# Patient Record
Sex: MALE | Race: WHITE | Employment: FULL TIME | URBAN - METROPOLITAN AREA
[De-identification: names, ages, dates, MRNs, and addresses within clinical notes are randomized per-mention and may not be internally consistent; named-entity substitution may affect disease eponyms.]

---

## 2022-12-02 ENCOUNTER — OFFICE VISIT (OUTPATIENT)
Dept: ORTHOPEDIC SURGERY | Age: 57
End: 2022-12-02
Payer: COMMERCIAL

## 2022-12-02 VITALS — WEIGHT: 315 LBS | HEIGHT: 73 IN | BODY MASS INDEX: 41.75 KG/M2

## 2022-12-02 DIAGNOSIS — M17.11 OSTEOARTHRITIS OF RIGHT KNEE, UNSPECIFIED OSTEOARTHRITIS TYPE: Primary | ICD-10-CM

## 2022-12-02 NOTE — PATIENT INSTRUCTIONS

## 2022-12-02 NOTE — PROGRESS NOTES
Name: Juan C Kohler    : 1965     Service Dept: [address] [facility]    Chief Complaint   Patient presents with    Knee Pain        Visit Vitals  Ht 6' 1\" (1.854 m)   Wt 320 lb (145.2 kg)   BMI 42.22 kg/m²        No Known Allergies        Patient Active Problem List   Diagnosis Code    History of laparoscopic partial gastrectomy Z90.3    Hyperlipidemia E78.5    DJD (degenerative joint disease) M19.90    Cholelithiasis K80.20    Hepatic steatosis K76.0      History reviewed. No pertinent family history. Social History     Socioeconomic History    Marital status:    Tobacco Use    Smoking status: Never    Smokeless tobacco: Never   Substance and Sexual Activity    Alcohol use: No    Drug use: No     Social Determinants of Health     Physical Activity: Insufficiently Active    Days of Exercise per Week: 2 days    Minutes of Exercise per Session: 10 min      Past Surgical History:   Procedure Laterality Date    HX GASTRECTOMY  2012    Laparoscopic vertical sleeve    HX HERNIA REPAIR  8663    Umbilical hernia repair    HX ORTHOPAEDIC      ACL repair    HX OTHER SURGICAL  12    Wedge liver biopsy    HX UROLOGICAL      Kidney stone removal      Past Medical History:   Diagnosis Date    Calculus of kidney     Cholelithiasis     DJD (degenerative joint disease)     GERD (gastroesophageal reflux disease)     Hepatic steatosis     History of laparoscopic partial gastrectomy     Hyperlipidemia     Morbid obesity ([address]         I have reviewed and agree with [address] and [facility]AN[facility] and intake form in chart and the record furthermore I have reviewed prior medical record(s) regarding this patients care during this appointment.      Review of Systems:   Patient is a pleasant appearing individual, appropriately dressed, well hydrated, well nourished, who is alert, appropriately oriented for age, and in no acute distress with a normal gait and normal affect who does not appear to be in any significant pain. Physical Exam:  Right Knee -Decrease range of motion with flexion, Knee arc of greater than 50 degrees, Some crepitation, Grossly neurovascularly intact, Good cap refill, No skin lesion, Moderate swelling, some gross instability, Some quadriceps weakness, Kellgren and Marcio at least grade 3    Left Knee - Full Range of Motion, No crepitation, Grossly neurovascularly intact, Good cap refill, No skin lesion, No swelling, No gross instability, No quadriceps weakness    Encounter Diagnoses     ICD-10-CM ICD-9-CM   1. Osteoarthritis of right knee, unspecified osteoarthritis type  M17.11 715.96       HPI:  The patient is here with a chief complaint of right knee pain, throbbing burning pain. Status post x-rays of right knee which is positive for severe OA with one metallic screw both in the tibia and in the femur done at an outside institution. Continues to have difficulty. Pain is 5/10. Assessment/Plan:  Plan at this point, my recommendation if he decides once he loses the weight would be for [employer] right total knee. We will get new evaluation of his x-rays, but he would need vascular clearance as well. He would be considered high risk because of his BMI. He is going to continue weight loss program and go from there. I did offer injection, but he wants to wait. As part of continued conservative pain management options the patient was advised to utilize Tylenol or OTC NSAIDS as long as it is not medically contraindicated. Return to Office: Follow-up and Dispositions    Return if symptoms worsen or fail to improve. Scribed by Dago Serrano LPN as dictated by [facility] [place] SOWMYA Soto MD.  Documentation True and Accepted Nixon Soto MD

## 2022-12-02 NOTE — LETTER
12/7/2022    Patient: Vanessa Mann   YOB: 1965   Date of Visit: 12/2/2022     Walter Pablo MD  [address] [place] [id number]  Via Fax: [phone]    Dear Walter Pablo MD,      Thank you for referring Mr. Elora Snellen to [facility] [address] AND [facility] for evaluation. My notes for this consultation are attached. If you have questions, please do not hesitate to call me. I look forward to following your patient along with you.       Sincerely,    Krystina Ambriz MD

## 2023-11-10 ENCOUNTER — HOME HEALTH ADMISSION (OUTPATIENT)
Age: 58
End: 2023-11-10
Payer: COMMERCIAL

## 2023-11-11 ENCOUNTER — HOME CARE VISIT (OUTPATIENT)
Age: 58
End: 2023-11-11
Payer: COMMERCIAL

## 2023-11-11 VITALS
DIASTOLIC BLOOD PRESSURE: 78 MMHG | OXYGEN SATURATION: 96 % | SYSTOLIC BLOOD PRESSURE: 140 MMHG | RESPIRATION RATE: 14 BRPM | TEMPERATURE: 97.7 F

## 2023-11-11 PROCEDURE — G0151 HHCP-SERV OF PT,EA 15 MIN: HCPCS

## 2023-11-11 ASSESSMENT — ENCOUNTER SYMPTOMS: PAIN LOCATION - PAIN QUALITY: DULL ACHING

## 2023-11-11 NOTE — HOME HEALTH
of:  1. Walking every hour during the day with RW  2. supine therex R LE AP,QS, SLR, HS, SAQ, gluteal squeezes 3 daily x 10  Written HEP issued, patient/caregiver verbalized understanding. CONTINUED NEED FOR THE FOLLOWING SKILLS: HH PT is medically necessary to address pain, decreased ROM, decreased strength, increased swelling, impaired bed mobility, decreased independence with functional transfers, impaired gait, impaired stair negotiation, and impaired balance in order to improve functional independence, quality of life, return to PLOF, and reduce the risk for falls. ASSESSMENT: Pt presents with decreased AROM R LE. Decreased strength R LE. A required will all bed mobility, transfers. Pt is amb limited distances with RW with A. Pt presents with decreased dynamic standing balance. A required on stairs. PLAN: Pt will be seen to establish and upgrade HEP. Gait training with  the least restrictive A DEV on flat and uneven surfaces. Bed mobility training, transfer training. Stair training. Dynamic standing balance re -education. Reinforce general safety precautions. DISCHARGE PLANNING DISCUSSED: Discharge to self and family under MD supervision once all goals have been met or patient has reached max potential. Patient/caregiver verbalized understanding.  office notified that PT Admit completed 11/11/23. Medications reconciled. All medications are available in the home this visit. The following education was provided regarding medications, medication interactions, and look alike medications. Medications are effective at this time. no severe interactions noted. Pt educated to take Elquis as prescribed. Instructed patient/caregiver to notify PT of signs and symptoms of anticoagulant adverse effects including bleeding gums, blood in urine or stool, easily bruising. Instructed on importance of fall prevention due to risk for bleeding. Pt educated to take tramadol and oxycodone as prescribed.

## 2023-11-12 ENCOUNTER — HOME CARE VISIT (OUTPATIENT)
Age: 58
End: 2023-11-12
Payer: COMMERCIAL

## 2023-11-12 PROCEDURE — G0157 HHC PT ASSISTANT EA 15: HCPCS

## 2023-11-13 ENCOUNTER — HOME CARE VISIT (OUTPATIENT)
Age: 58
End: 2023-11-13
Payer: COMMERCIAL

## 2023-11-13 VITALS
RESPIRATION RATE: 14 BRPM | DIASTOLIC BLOOD PRESSURE: 74 MMHG | TEMPERATURE: 98.3 F | HEART RATE: 86 BPM | OXYGEN SATURATION: 96 % | SYSTOLIC BLOOD PRESSURE: 122 MMHG

## 2023-11-13 PROCEDURE — G0157 HHC PT ASSISTANT EA 15: HCPCS

## 2023-11-13 ASSESSMENT — ENCOUNTER SYMPTOMS: PAIN LOCATION - PAIN QUALITY: TIGHTNESS, SORENESS

## 2023-11-14 VITALS
SYSTOLIC BLOOD PRESSURE: 128 MMHG | RESPIRATION RATE: 16 BRPM | TEMPERATURE: 97.7 F | OXYGEN SATURATION: 99 % | HEART RATE: 70 BPM | DIASTOLIC BLOOD PRESSURE: 80 MMHG

## 2023-11-14 ASSESSMENT — ENCOUNTER SYMPTOMS: PAIN LOCATION - PAIN QUALITY: DULL ACHE

## 2023-11-14 NOTE — HOME HEALTH
SUBJECTIVE: Patient reported feeling low pain #1/10 from his R knee this morning. CAREGIVER INVOLVEMENT/ASSISTANCE NEEDED FOR: Patient's wife assists with transportation, ADLS, and IADLS. HOME HEALTH SUPPLIES BY TYPE AND QUANTITY ORDERED/DELIVERED THIS VISIT INCLUDE: None needed for this visit. OBJECTIVE:  See interventions. PATIENT RESPONSE TO TREATMENT: Patient did not report any increase in pain after walking and exercising. PATIENT LEVEL OF UNDERSTANDING OF EDUCATION PROVIDED: Patient verbalized understanding on anticoagulant meds, fall prevention, pain management techniques, and signs and symptoms for infection. ASSESSMENT OF PROGRESS TOWARD GOALS: Patient addressed goals for gait, bed mobility, and strength. Pt needed SBA for gait training today to ambulate safely. Patient ambulated with decreased maribeth, decreased foot clearance, and forward flexed trunk. Gave multiple vc's for pt to lift B foot higher to clear floor safely and to maintain erect posture. Pt needs reinforcement for safety with gait and to improve posture. Pt also got in and out of bed Mod I and scooting in bed Mod I today. Pt needed extra time but demonstrated good technique for independent bed mobility. In addition, pt performed supine and seated therapeutic exercises. Reinforced compliance with HEP. CONTINUED NEED FOR THE FOLLOWING SKILLS: Gait Training, Stairs Training, Transfer Training, [employer], Balance Training, ROM, and Therapeutic Exercises. PLAN FOR NEXT VISIT: Patient will be seen 1w1 3w1 to increase safety with gait, to increase safety with stairs, to improve transfer technique, to improve bed mobility technique, to improve balance, and to increase strength of B LE. DISCHARGE PLANNING DISCUSSED WITH PATIENT/CAREGIVER: Discharge patient to family with MD supervision when all goals are met. Pt/Caregiver did verbalize understanding of discharge planning.

## 2023-11-14 NOTE — HOME HEALTH
encouragement. Plan:  Discharge planning discussed at this visit regarding eventual discharge once patient has met goals and/or met maximum benefit from home health skilled PT services with patient understanding and agreeable at this time. Continued need for skilled home health PT at this time to address deficits, reduce risk of falls, and obtain goals as previously established per plan of care. Further gait training and target lateral quadrant tightness/restrictions to be able to make better corrections towards improving toeing out with right LE. Monitor for pain with completion of skilled therapy interventions.

## 2023-11-15 ENCOUNTER — HOME CARE VISIT (OUTPATIENT)
Age: 58
End: 2023-11-15
Payer: COMMERCIAL

## 2023-11-15 VITALS
OXYGEN SATURATION: 99 % | DIASTOLIC BLOOD PRESSURE: 65 MMHG | HEART RATE: 64 BPM | SYSTOLIC BLOOD PRESSURE: 132 MMHG | TEMPERATURE: 97.9 F | RESPIRATION RATE: 16 BRPM

## 2023-11-15 PROCEDURE — G0157 HHC PT ASSISTANT EA 15: HCPCS

## 2023-11-15 ASSESSMENT — ENCOUNTER SYMPTOMS: PAIN LOCATION - PAIN QUALITY: DULL ACHE

## 2023-11-16 NOTE — HOME HEALTH
strength of B LE. DISCHARGE PLANNING DISCUSSED WITH PATIENT/CAREGIVER: Discharge patient to family with MD supervision when all goals are met. Pt/Caregiver did verbalize understanding of discharge planning.

## 2023-11-20 ENCOUNTER — HOME CARE VISIT (OUTPATIENT)
Age: 58
End: 2023-11-20
Payer: COMMERCIAL

## 2023-11-20 VITALS
DIASTOLIC BLOOD PRESSURE: 78 MMHG | RESPIRATION RATE: 16 BRPM | SYSTOLIC BLOOD PRESSURE: 132 MMHG | TEMPERATURE: 97.7 F | OXYGEN SATURATION: 99 % | HEART RATE: 66 BPM

## 2023-11-20 PROCEDURE — G0157 HHC PT ASSISTANT EA 15: HCPCS

## 2023-11-20 NOTE — HOME HEALTH
SUBJECTIVE: Patient reported feeling no pain from his R knee this morning again this morning. CAREGIVER INVOLVEMENT/ASSISTANCE NEEDED FOR: Patient's wife assists with transportation, ADLS, and IADLS. HOME HEALTH SUPPLIES BY TYPE AND QUANTITY ORDERED/DELIVERED THIS VISIT INCLUDE: None needed for this visit. OBJECTIVE:  See interventions. PATIENT RESPONSE TO TREATMENT: Patient did not report any increase in pain after walking outside, performing car transfer, and negotiating stairs. PATIENT LEVEL OF UNDERSTANDING OF EDUCATION PROVIDED: Patient repeated back teaching on anticoagulant meds, fall prevention, pain management techniques, and signs and symptoms for infection. ASSESSMENT OF PROGRESS TOWARD GOALS: Patient addressed goals for gait, stairs, and transfers. Pt continues to need Supervision to ambulate safely but progressed to ambulating outside using [facility] for gait training today over uneven surfaces. Pt ambulated with slight R antalgic gait but demonstrated good safety awareness. Pt also previously did not go up and down stairs but progressed to Supervision for stair training today. Pt led with his L LE to go up stairs and led with his R LE to go down stairs. Pt demonstrated good safety awareness with stairs negotiation. In addition, pt previously needed Supervision to perform sit < > stand transfers but progressed to Mod I for transfer training today. Patient demonstrated good technique and improved ability to perform sit < > stand transfers and to get in and out of his car independently. Moreover, R knee ROM improved to 0 - 100 degrees. Discussed with patient/CG plan to discharge pt from [facility] PT services this week. Pt/CG verbalized understanding and is in agreement with discharge plan. CONTINUED NEED FOR THE FOLLOWING SKILLS: Gait Training, Stairs Training, Transfer Training, [employer], Balance Training, ROM, and Therapeutic Exercises.   PLAN FOR NEXT VISIT: Patient will be seen 2w1 to increase

## 2023-11-21 ENCOUNTER — HOME CARE VISIT (OUTPATIENT)
Age: 58
End: 2023-11-21
Payer: COMMERCIAL

## 2023-11-21 VITALS
TEMPERATURE: 98.3 F | OXYGEN SATURATION: 99 % | DIASTOLIC BLOOD PRESSURE: 70 MMHG | SYSTOLIC BLOOD PRESSURE: 122 MMHG | RESPIRATION RATE: 16 BRPM | HEART RATE: 60 BPM

## 2023-11-21 PROCEDURE — G0157 HHC PT ASSISTANT EA 15: HCPCS

## 2023-11-22 NOTE — HOME HEALTH
SUBJECTIVE: Patient reported feeling good this morning and had no c/o pain from his R knee. CAREGIVER INVOLVEMENT/ASSISTANCE NEEDED FOR: Patient's wife assists with transportation, ADLS, and IADLS. HOME HEALTH SUPPLIES BY TYPE AND QUANTITY ORDERED/DELIVERED THIS VISIT INCLUDE: None needed for this visit. OBJECTIVE:  See interventions. PATIENT RESPONSE TO TREATMENT: Patient did not report any increase in pain after walking outside and negotiating stairs. PATIENT LEVEL OF UNDERSTANDING OF EDUCATION PROVIDED: Goal met. ASSESSMENT OF PROGRESS TOWARD GOALS: Patient addressed goals for gait, stairs, balance, and ROM. Pt previously needed Supervision to ambulate safely but progressed to Mod I using SBQC for gait training today over uneven surfaces. Pt ambulated with improved stability and demonstrated good safety awareness with ambulation. Pt also previously needed Supervision to go up and down stairs but progressed to Mod I for stair training today. Pt led with his L LE to go up stairs and led with his R LE to go down stairs. Pt demonstrated good safety awareness with stairs negotiation. In addition, patient demonstrated improved balance as evidence by improving his Tinetti balance assessment score from 18/28 from PT initial evaluation to 22/28 today. Moreover, R knee ROM improved to 0 - 105 degrees. Discussed with patient/CG plan to discharge pt from [facility] PT services next visit. Pt/CG verbalized understanding and is in agreement with discharge plan. He will begin outpatient PT on Wednesday, 11/29/23, at [facility] [place] [facility] in [place], [place]. CONTINUED NEED FO R THE FOLLOWING SKILLS: PT Reassessment. PLAN FOR NEXT VISIT: Patient will be seen 1w1 to reassess safety with gait, safety with stairs, transfer technique, bed mobility technique, balance, and strength of B LE. DISCHARGE PLANNING DISCUSSED WITH PATIENT/CAREGIVER: Discharge patient to family with MD supervision when all goals are met.  Pt/Caregiver did

## 2023-11-24 ENCOUNTER — HOME CARE VISIT (OUTPATIENT)
Age: 58
End: 2023-11-24
Payer: COMMERCIAL

## 2023-11-24 VITALS
OXYGEN SATURATION: 96 % | HEART RATE: 68 BPM | SYSTOLIC BLOOD PRESSURE: 135 MMHG | DIASTOLIC BLOOD PRESSURE: 68 MMHG | TEMPERATURE: 97 F | RESPIRATION RATE: 16 BRPM

## 2023-11-24 PROCEDURE — G0151 HHCP-SERV OF PT,EA 15 MIN: HCPCS

## 2023-11-24 ASSESSMENT — ENCOUNTER SYMPTOMS: PAIN LOCATION - PAIN QUALITY: DULL ACHING

## 2023-11-24 NOTE — HOME HEALTH
Pt called and would like someone to call her back in regards to [facility] papers. Pt can be reached at HOME: [phone]   WORK: N/A   CELL: [phone]        remained the same throughout tx session   PATIENT LEVEL OF UNDERSTANDING OF EDUCATION PROVIDED  Cont to use SBQC at all times when amb in public for safety   PATIENT EDUCATION PROVIDED THIS VISIT: safety, HEP, walking, deep breathing,         HEP consisting of:  1. Walking every hour during the daytime for 3-5 minutes using SBQC   2. Supine: APs, heel slides, quad sets, glut sets, SLR, and SAQ x 10 reps each, 3x/day. 3. Sitting: APs, R LAQ, R knee flexion, and hip abd x 10 reps each 3x/day. 4. Standing: HR/TR, R hip flexion, and R hamstring curls x 10 reps each 3x/day  Written HEP issued, patient/caregiver verbalized understanding. Patient is s/p R TKR and has been treated for ROM, strengthening, gait training, stair training, HEP training, safety training, and balance training. On [facility]  11/11/23  AROM was R knee is sitting -9-80 degrees. Today at NM AROM is R knee in sitting 0-105 degrees  On SOC strength was  R hip flexors -3/5 R quads and hamstrings -3/5 R DF/PF 5/5  L hip flexors, quads, hamstrings DF/PF 5/5. Today at NM strength is  R hip flexors +45 R quads and hamstrings +4/5 R DF/PF 5/5 . On [facility] bed mobility was sit <> supine with S. Pt was educated to use contra lateral leg A to assist with transfer is necessary to prevent staining lower abs as her recently had a hernia repair . Today at [place] bed mobility is :supine<>sit MOD I. On [facility] transfers were sit to stand from couch from recliner chair. MOD I. Per PTA visit 11/20/23 Patient progressed to performing sit < > stand transfer Mod I from his couch, toilet, and bed as well as performing car transfer Mod I to promote increased safety and increased independence for all transfers. Patient demonstrated good technique and improved ability to perform sit < > stand transfers and to get in and out of his car independently. .  Today at NM transfers are sit to stand from couch from recliner chair. MOD I.  Per PTA visit 11/20/23 Patient progressed to performing

## 2023-11-29 ENCOUNTER — HOSPITAL ENCOUNTER (OUTPATIENT)
Facility: HOSPITAL | Age: 58
Setting detail: RECURRING SERIES
Discharge: HOME | End: 2023-12-02
Payer: COMMERCIAL

## 2023-11-29 PROCEDURE — 97110 THERAPEUTIC EXERCISES: CPT

## 2023-11-29 PROCEDURE — 97162 PT EVAL MOD COMPLEX 30 MIN: CPT

## 2023-11-29 NOTE — THERAPY EVALUATION
PHYSICAL / OCCUPATIONAL THERAPY - DAILY TREATMENT NOTE (updated )  For Eval visit    Patient Name: Crystal Spencer    Date: 2023    : 1965  Insurance: Payor: Geraldine Luo / Plan: Geraldine Luo / Product Type: *No Product type* /      Patient  verified yes     Visit #   Current / Total 1 8-12   Time   In / Out 3:10 3:55   Pain   In / Out 1/10 1/10   Subjective Functional Status/Changes: See POC     TREATMENT AREA =  see POC    OBJECTIVE           35 min   Eval - untimed                      Therapeutic Procedures: Tx Min Billable or 1:1 Min (if diff from Tx Min) Procedure, Rationale, Specifics   10  [address] Therapeutic Exercise (timed):  increase ROM, strength, coordination, balance, and proprioception to improve patient's ability to progress to PLOF and address remaining functional goals.  (see flow sheet as applicable)     Details if applicable:                   Details if applicable:            Details if applicable:            Details if applicable:     10  [facility] Totals Reminder: bill using total billable min of TIMED therapeutic procedures (example: do not include dry needle or estim unattended, both untimed codes, in totals to left)  8-22 min = 1 unit; 23-37 min = 2 units; 38-52 min = 3 units; 53-67 min = 4 units; 68-82 min = 5 units   Total Total     [x]  Patient Education billed concurrently with other procedures   [x] Review HEP    [] Progressed/Changed HEP, detail:    [] Other detail:       Objective Information/Functional Measures/Assessment    See POC    Patient will continue to benefit from skilled PT / OT services to modify and progress therapeutic interventions, analyze and address functional mobility deficits, analyze and address ROM deficits, analyze and address strength deficits, analyze and address soft tissue restrictions, analyze and cue for proper movement patterns, analyze and modify for postural abnormalities, analyze and address imbalance/dizziness, and instruct in home and
bilateral girth measures taken and listed above the edema is considered significant and having an impact on the patient's strength, balance, gait, transfers, self care, and ADL's), and 63137 Gait Training  Patient / Family readiness to learn indicated by: asking questions, trying to perform skills, interest, return verbalization , and return demonstration   Persons(s) to be included in education: patient (P)  Barriers to Learning/Limitations: none  Measures taken if barriers to learning present: n/a  Patient Goal (s): \"walk without cane, tolerate prolonged standing\"  Patient Self Reported Health Status: good  Rehabilitation Potential: good    Short Term Goals: To be accomplished in 4 weeks  Pt will be ind and compliant with HEP For self management of sx  Eval: issued; return demos understanding  2. Improve R knee flexion AROM to 105 deg to improve gait symmetry and transfers  Eval: 88  Long Term Goals: To be accomplished in 8 weeks  Improve R knee AROM >/= 0-125 deg to improve functional mobility for transfers and stairs  Eval: 1-0-88  2. Pt will ascend/descend 4 stairs with reciprocal pattern, 1 HR in order to improve community/household accessibility  Eval: non-reciprocal pattern with B HR  3. Improve FOTO Score to >/= 70/100 to indicate improved function  Eval: 52/100    Frequency / Duration: Patient to be seen 2-3 times per week for 4 weeks    Post operative: URGENT: Patient was evaluated for a post operative condition and early physical therapy intervention is critical to positive outcomes. Insurance authorization should be expedited to prevent delay in treatment.         Patient/ Caregiver education and instruction: Diagnosis, prognosis, self care and exercises [x]  Plan of care has been reviewed with PTA    Certification Period: Miguel Castellanos PT       11/29/2023       1:01 PM    Payor: Nehemias Medina / Plan: Nehemias Medina / Product Type: *No Product type* /     No Physician signature required

## 2023-12-06 ENCOUNTER — HOSPITAL ENCOUNTER (OUTPATIENT)
Facility: HOSPITAL | Age: 58
Setting detail: RECURRING SERIES
Discharge: HOME | End: 2023-12-09
Payer: COMMERCIAL

## 2023-12-06 PROCEDURE — 97530 THERAPEUTIC ACTIVITIES: CPT

## 2023-12-06 PROCEDURE — 97140 MANUAL THERAPY 1/> REGIONS: CPT

## 2023-12-06 PROCEDURE — 97110 THERAPEUTIC EXERCISES: CPT

## 2023-12-06 NOTE — PROGRESS NOTES
units; 38-52 min = 3 units; 53-67 min = 4 units; 68-82 min = 5 units   Total Total     [x]  Patient Education billed concurrently with other procedures   [x] Review HEP    [] Progressed/Changed HEP, detail:    [] Other detail:       Objective Information/Functional Measures/Assessment    - Right knee ROM: -2-104 deg  Initiated POC as per flowsheet. Pt arrived for first therapy follow-up since IE. Pt tolerated treatment session well with min to mod challenge without increased pain or discomfort. Pt required VC for proper form and technique with therex to prevent compensatory movements. Extensor lag noted during SLR. Noted decreased HS flexibility with hamstring stretch. Pt is compliant with HEP. Right knee strength and AROM continues to increase. No change in pain noted post treatment session. Will continue to progress per pt tolerance. Patient will continue to benefit from skilled PT / OT services to modify and progress therapeutic interventions, analyze and address functional mobility deficits, analyze and address ROM deficits, and analyze and address strength deficits to address functional deficits and attain remaining goals. Progress toward goals / Updated goals:  []  See Progress Note/Recertification    Short Term Goals: To be accomplished in 4 weeks  Pt will be ind and compliant with HEP For self management of sx  Eval: issued; return demos understanding; 12/6/23: Progressing, reports initial compliance  2. Improve R knee flexion AROM to 105 deg to improve gait symmetry and transfers  Eval: 88  Long Term Goals: To be accomplished in 8 weeks  Improve R knee AROM >/= 0-125 deg to improve functional mobility for transfers and stairs  Eval: 1-0-88  2. Pt will ascend/descend 4 stairs with reciprocal pattern, 1 HR in order to improve community/household accessibility  Eval: non-reciprocal pattern with B HR  3.  Improve FOTO Score to >/= 70/100 to indicate improved function  Eval: 52/100    Next PN/ RC due

## 2023-12-12 ENCOUNTER — HOSPITAL ENCOUNTER (OUTPATIENT)
Facility: HOSPITAL | Age: 58
Setting detail: RECURRING SERIES
Discharge: HOME | End: 2023-12-15
Payer: COMMERCIAL

## 2023-12-12 PROCEDURE — 97116 GAIT TRAINING THERAPY: CPT

## 2023-12-12 PROCEDURE — 97140 MANUAL THERAPY 1/> REGIONS: CPT

## 2023-12-12 PROCEDURE — 97110 THERAPEUTIC EXERCISES: CPT

## 2023-12-12 PROCEDURE — 97530 THERAPEUTIC ACTIVITIES: CPT

## 2023-12-12 NOTE — PROGRESS NOTES
PHYSICAL / OCCUPATIONAL THERAPY - DAILY TREATMENT NOTE (updated )    Patient Name: Yoselyn Thayer    Date: 2023    : 1965  Insurance: Payor: Arlin Tomas / Plan: DENISE NORMAN / Product Type: *No Product type* /      Patient  verified Yes     Visit #   Current / Total 3 8-12   Time   In / Out 330 424   Pain   In / Out 0 \"achey\"  0   Subjective Functional Status/Changes: Patient reports no new changes. He states that he felt \"pretty good\" after his initial visit last week. \"I think I'm getting a cold\" . Patient states that he has begun stepping \"normally\" at work now. \"Going up isn't bad, coming down takes a little longer\"      TREATMENT AREA =  Right knee pain [M25.561]    OBJECTIVE         Therapeutic Procedures:  66885 Therapeutic Exercise (timed):  increase ROM, strength, coordination, balance, and proprioception to improve patient's ability to progress to PLOF and address remaining functional goals. Tx Min Billable or 1:1 Min   (if diff from Tx Min) Details:   12  See flow sheet as applicable     67543 Manual Therapy (timed):  decrease pain, increase ROM, increase tissue extensibility, and decrease trigger points to improve patient's ability to progress to PLOF and address remaining functional goals. The manual therapy interventions were performed at a separate and distinct time from the therapeutic activities interventions . Tx Min Billable or 1:1 Min   (if diff from Tx Min) Details:   12  See flow sheet as applicable; Short sitting: PROM into right knee flexion and extension  Supine: STM/TPR to right HS/right popliteus/right quad, PROM into right knee flexion and extension, Grade 2 -3 TF joint mobs      33897 Therapeutic Activity (timed):  use of dynamic activities replicating functional movements to increase ROM, strength, coordination, balance, and proprioception in order to improve patient's ability to progress to PLOF and address remaining functional goals.    Tx Min Billable or 1:1

## 2023-12-21 ENCOUNTER — HOSPITAL ENCOUNTER (OUTPATIENT)
Facility: HOSPITAL | Age: 58
Setting detail: RECURRING SERIES
Discharge: HOME | End: 2023-12-24
Payer: COMMERCIAL

## 2023-12-21 PROCEDURE — 97110 THERAPEUTIC EXERCISES: CPT

## 2023-12-21 PROCEDURE — 97140 MANUAL THERAPY 1/> REGIONS: CPT

## 2023-12-21 PROCEDURE — 97112 NEUROMUSCULAR REEDUCATION: CPT

## 2023-12-28 ENCOUNTER — HOSPITAL ENCOUNTER (OUTPATIENT)
Facility: HOSPITAL | Age: 58
Setting detail: RECURRING SERIES
Discharge: HOME | End: 2023-12-31
Payer: COMMERCIAL

## 2023-12-28 PROCEDURE — 97530 THERAPEUTIC ACTIVITIES: CPT

## 2023-12-28 PROCEDURE — 97110 THERAPEUTIC EXERCISES: CPT

## 2023-12-28 PROCEDURE — 97140 MANUAL THERAPY 1/> REGIONS: CPT

## 2023-12-28 NOTE — PROGRESS NOTES
[facility]  [address] [place] [address]  Phone: [phone]   [phone]  PHYSICAL THERAPY PROGRESS NOTE  Patient Name: Shaan Do : 1965   Treatment/Medical Diagnosis: Right knee pain [M25.561]   Referral Source: Javier Baez MD     Date of Initial Visit: 23 Attended Visits: 7 Missed Visits: 0     SUMMARY OF TREATMENT  Physical therapy has consisted of therapeutic exercises for increased LE strength, ROM, and flexibility. Therapeutic activities performed to improve functional activities, model real life movements and performance specific to the patient's need with supervision to return the patient to their PLOF.  Neuromuscular Re-ed performed to improve coordination, improve mm activation, improve proprioception to improve the patient's ability to perform ADL/IADL's.  Manual therapy PRN for decreased muscle hypertonicity and increased ROM/flexibility. Pt education provided regarding HEP.     CURRENT STATUS  Pt is a 58 y.o. male who presents to PT with c/o R knee pain s/p R TKA (DOS 2023). Pt has returned to work ~1 month (40 hr/wk) with increased prolonged standing on concrete which he reports increases his pain/stiffness.     % improvement: 60%  Max pain 1/10  Avg pain 0-1/10; \"it's just an ache most of the time\"  Min pain 0/10    Current improvements:  Pt reports improved walking tolerance, improved general activity tolerance. Good objective increase in LE strength and ROM as per objective measurements below.  Remaining functional limitations:  worse with walking (>15 min), bending, stairs (descending>ascending), squats, prolonged positioning (causes it to \"lock up\")    Objective measures:  FUNCTIONAL ASSESSMENT:  Ambulates wihtout AD, with R too many toes sign, min antalgic RLE with slight decrease in push off and stance time  Squat 85 deg with compensatory R hip ER, slight L wt shift, quad dominant

## 2023-12-28 NOTE — PROGRESS NOTES
PHYSICAL / OCCUPATIONAL THERAPY - DAILY TREATMENT NOTE (updated )    Patient Name: Shaan Do    Date: 2023    : 1965  Insurance: Payor: DENISE / Plan: DENISE RPN / Product Type: *No Product type* /      Patient  verified Yes     Visit #   Current / Total 7 8-12   Time   In / Out 2:40 3:25   Pain   In / Out 0.5 \"ache\" 0   Subjective Functional Status/Changes: See PN     TREATMENT AREA =  Right knee pain [M25.561]    OBJECTIVE         Therapeutic Procedures:    Tx Min Billable or 1:1 Min (if diff from Tx Min) Procedure, Rationale, Specifics   12  [id number] Therapeutic Exercise (timed):  increase ROM, strength, coordination, balance, and proprioception to improve patient's ability to progress to PLOF and address remaining functional goals. (see flow sheet as applicable)     Details if applicable:       25  85149 Therapeutic Activity (timed):  use of dynamic activities replicating functional movements to increase ROM, strength, coordination, balance, and proprioception in order to improve patient's ability to progress to PLOF and address remaining functional goals.  (see flow sheet as applicable)     Details if applicable:     8  33308 Manual Therapy (timed):  decrease pain, increase ROM, increase tissue extensibility, and decrease trigger points to improve patient's ability to progress to PLOF and address remaining functional goals.  The manual therapy interventions were performed at a separate and distinct time from the therapeutic activities interventions . (see flow sheet as applicable)     Details if applicable:  STM to R quadriceps, R medial HS; R knee flexion PROM all performed in supine. CFM to superior incision (distal quad/quad tendon)          Details if applicable:            Details if applicable:     45  [facility] Totals Reminder: bill using total billable min of TIMED therapeutic procedures (example: do not include dry needle or estim unattended, both untimed codes, in totals to

## 2024-01-03 ENCOUNTER — HOSPITAL ENCOUNTER (OUTPATIENT)
Facility: HOSPITAL | Age: 59
Setting detail: RECURRING SERIES
Discharge: HOME | End: 2024-01-06
Payer: COMMERCIAL

## 2024-01-03 PROCEDURE — 97110 THERAPEUTIC EXERCISES: CPT

## 2024-01-03 PROCEDURE — 97530 THERAPEUTIC ACTIVITIES: CPT

## 2024-01-03 NOTE — PROGRESS NOTES
PHYSICAL / OCCUPATIONAL THERAPY - DAILY TREATMENT NOTE (updated )    Patient Name: Shaan Do    Date: 1/3/2024    : 1965  Insurance: Payor: DENISE / Plan: DENISE RPN / Product Type: *No Product type* /      Patient  verified Yes     Visit #   Current / Total 8 15   Time   In / Out [id number]   Pain   In / Out 0 0   Subjective Functional Status/Changes: Pt states knee feels good today and reports no pain or discomfort     TREATMENT AREA =  Right knee pain [M25.561]    OBJECTIVE         Therapeutic Procedures:    Tx Min Billable or 1:1 Min (if diff from Tx Min) Procedure, Rationale, Specifics   20  67321 Therapeutic Exercise (timed):  increase ROM, strength, coordination, balance, and proprioception to improve patient's ability to progress to PLOF and address remaining functional goals. (see flow sheet as applicable)     Details if applicable:       25  84685 Therapeutic Activity (timed):  use of dynamic activities replicating functional movements to increase ROM, strength, coordination, balance, and proprioception in order to improve patient's ability to progress to PLOF and address remaining functional goals.  (see flow sheet as applicable)     Details if applicable:     x  38121 Manual Therapy (timed):  decrease pain, increase ROM, and increase tissue extensibility to improve patient's ability to progress to PLOF and address remaining functional goals.  The manual therapy interventions were performed at a separate and distinct time from the therapeutic activities interventions . (see flow sheet as applicable)     Details if applicable:            Details if applicable:            Details if applicable:     45  [facility] Totals Reminder: bill using total billable min of TIMED therapeutic procedures (example: do not include dry needle or estim unattended, both untimed codes, in totals to left)  8-22 min = 1 unit; 23-37 min = 2 units; 38-52 min = 3 units; 53-67 min = 4 units; 68-82 min = 5 units

## 2024-01-05 ENCOUNTER — HOSPITAL ENCOUNTER (OUTPATIENT)
Facility: HOSPITAL | Age: 59
Setting detail: RECURRING SERIES
End: 2024-01-05
Payer: COMMERCIAL

## 2024-01-10 ENCOUNTER — HOSPITAL ENCOUNTER (OUTPATIENT)
Facility: HOSPITAL | Age: 59
Setting detail: RECURRING SERIES
End: 2024-01-10
Payer: COMMERCIAL

## 2024-01-12 ENCOUNTER — APPOINTMENT (OUTPATIENT)
Facility: HOSPITAL | Age: 59
End: 2024-01-12
Payer: COMMERCIAL

## 2024-01-16 ENCOUNTER — APPOINTMENT (OUTPATIENT)
Facility: HOSPITAL | Age: 59
End: 2024-01-16
Payer: COMMERCIAL

## 2024-01-18 ENCOUNTER — APPOINTMENT (OUTPATIENT)
Facility: HOSPITAL | Age: 59
End: 2024-01-18
Payer: COMMERCIAL

## 2024-01-24 ENCOUNTER — APPOINTMENT (OUTPATIENT)
Facility: HOSPITAL | Age: 59
End: 2024-01-24
Payer: COMMERCIAL

## 2024-01-26 ENCOUNTER — APPOINTMENT (OUTPATIENT)
Facility: HOSPITAL | Age: 59
End: 2024-01-26
Payer: COMMERCIAL

## 2024-01-30 ENCOUNTER — APPOINTMENT (OUTPATIENT)
Facility: HOSPITAL | Age: 59
End: 2024-01-30
Payer: COMMERCIAL